# Patient Record
Sex: MALE | URBAN - METROPOLITAN AREA
[De-identification: names, ages, dates, MRNs, and addresses within clinical notes are randomized per-mention and may not be internally consistent; named-entity substitution may affect disease eponyms.]

---

## 2019-10-16 ENCOUNTER — IMPORTED ENCOUNTER (OUTPATIENT)
Dept: URBAN - METROPOLITAN AREA CLINIC 1 | Facility: CLINIC | Age: 54
End: 2019-10-16

## 2019-10-16 PROBLEM — H52.03: Noted: 2019-10-16

## 2019-10-16 PROBLEM — H52.4: Noted: 2019-10-16

## 2019-10-16 PROCEDURE — S0620 ROUTINE OPHTHALMOLOGICAL EXA: HCPCS

## 2019-10-16 NOTE — PATIENT DISCUSSION
1.  Hyperopia/Presbyopia: Discussed with patient the option of a prescription pair of office glasses. 2.  Cataracts OU- Observe. MRX for glasses given. Return for an appointment in 1 year 36 with Dr. Omer Christine.

## 2022-04-02 ASSESSMENT — TONOMETRY
OD_IOP_MMHG: 14
OS_IOP_MMHG: 13

## 2022-04-02 ASSESSMENT — VISUAL ACUITY
OD_CC: 20/20
OS_CC: 20/20